# Patient Record
Sex: MALE | Race: WHITE | NOT HISPANIC OR LATINO | Employment: OTHER | URBAN - METROPOLITAN AREA
[De-identification: names, ages, dates, MRNs, and addresses within clinical notes are randomized per-mention and may not be internally consistent; named-entity substitution may affect disease eponyms.]

---

## 2024-05-23 ENCOUNTER — OFFICE VISIT (OUTPATIENT)
Dept: NEUROSURGERY | Facility: CLINIC | Age: 74
End: 2024-05-23
Payer: MEDICARE

## 2024-05-23 VITALS
WEIGHT: 161.5 LBS | SYSTOLIC BLOOD PRESSURE: 124 MMHG | TEMPERATURE: 97.2 F | DIASTOLIC BLOOD PRESSURE: 68 MMHG | HEIGHT: 67 IN | HEART RATE: 52 BPM | OXYGEN SATURATION: 97 % | BODY MASS INDEX: 25.35 KG/M2 | RESPIRATION RATE: 16 BRPM

## 2024-05-23 DIAGNOSIS — M51.34 THORACIC DEGENERATIVE DISC DISEASE: ICD-10-CM

## 2024-05-23 DIAGNOSIS — M50.30 DDD (DEGENERATIVE DISC DISEASE), CERVICAL: ICD-10-CM

## 2024-05-23 DIAGNOSIS — M54.12 CERVICAL RADICULOPATHY: Primary | ICD-10-CM

## 2024-05-23 DIAGNOSIS — M54.6 PAIN IN THORACIC SPINE: ICD-10-CM

## 2024-05-23 RX ORDER — TRAZODONE HYDROCHLORIDE 50 MG/1
TABLET ORAL
COMMUNITY

## 2024-05-23 RX ORDER — ESCITALOPRAM OXALATE 20 MG/1
1 TABLET ORAL DAILY
COMMUNITY

## 2024-05-23 RX ORDER — BUSPIRONE HYDROCHLORIDE 10 MG/1
1 TABLET ORAL DAILY
COMMUNITY

## 2024-05-23 RX ORDER — PREGABALIN 100 MG/1
CAPSULE ORAL
COMMUNITY

## 2024-05-23 RX ORDER — TIMOLOL MALEATE 5 MG/ML
SOLUTION/ DROPS OPHTHALMIC
COMMUNITY
Start: 2024-01-28

## 2024-05-23 RX ORDER — OXYCODONE AND ACETAMINOPHEN 10; 325 MG/1; MG/1
TABLET ORAL
COMMUNITY

## 2024-05-23 RX ORDER — TAMSULOSIN HYDROCHLORIDE 0.4 MG/1
CAPSULE ORAL
COMMUNITY
Start: 2024-01-25

## 2024-05-23 RX ORDER — PREDNISOLONE ACETATE 10 MG/ML
SUSPENSION/ DROPS OPHTHALMIC
COMMUNITY
Start: 2024-01-28

## 2024-05-23 RX ORDER — ROPINIROLE 0.5 MG/1
1 TABLET, FILM COATED ORAL DAILY
COMMUNITY

## 2024-05-23 RX ORDER — CELECOXIB 200 MG/1
CAPSULE ORAL
COMMUNITY

## 2024-05-23 RX ORDER — ATORVASTATIN CALCIUM 20 MG/1
1 TABLET, FILM COATED ORAL DAILY
COMMUNITY

## 2024-05-23 NOTE — PROGRESS NOTES
Patient ID: Tony L Gowers is a 73 y.o. male is being seen for consultation today at the request of Jean Marie Coreas MD for multilevel cervical stenosis.    Imaging: Last MRI of the cervical spine performed on 04/29/2024    Subjective     The patient is here in regards to   Chief Complaint   Patient presents with    Neck Pain       History of Present Illness  Dionicio has been dealing with over 6 years of thoracic back pain and over the past 4 months has had gradually worsening right-sided cervical radiculopathy as well.  Has had multiple cervical/thoracic epidural steroid injections which were previously helpful but no longer last very long and his last injection gave him about 1.5 days of relief.  He has been taking Percocet 10 mg 3 times per day in addition to Lyrica 103 times per day for pain relief but he feels that the medications make him a little bit forgetful and feels like he is swimming and this affects his activities of daily living.  He has also recently completed physical therapy for his mid back      While in the room and during my examination of the patient I wore a mask and eye protection.  I washed my hands before and after this patient encounter.  The patient was also wearing a mask.    The following portions of the patient's history were reviewed and updated as appropriate: allergies, current medications, past family history, past medical history, past social history, past surgical history and problem list.    Review of Systems   Constitutional:  Negative for fever.   HENT:  Positive for tinnitus. Negative for congestion and trouble swallowing.    Eyes:  Negative for visual disturbance.   Respiratory:  Negative for chest tightness and shortness of breath.    Cardiovascular:  Negative for chest pain.   Gastrointestinal:  Negative for diarrhea, nausea and vomiting.   Endocrine: Negative for cold intolerance and heat intolerance.   Genitourinary:  Positive for difficulty urinating.   Musculoskeletal:   Positive for back pain and neck stiffness. Negative for neck pain.   Skin:  Negative for rash.   Allergic/Immunologic: Positive for environmental allergies.   Neurological:  Positive for numbness (right arm). Negative for dizziness, weakness, light-headedness and headaches.   Hematological:  Does not bruise/bleed easily.   Psychiatric/Behavioral:  Positive for decreased concentration. Negative for confusion.         History reviewed. No pertinent past medical history.    No Known Allergies    History reviewed. No pertinent family history.    Social History     Socioeconomic History    Marital status:    Tobacco Use    Smoking status: Never    Smokeless tobacco: Never       History reviewed. No pertinent surgical history.      Objective     Vitals:    05/23/24 0845   BP: 124/68   Pulse: 52   Resp: 16   Temp: 97.2 °F (36.2 °C)   SpO2: 97%     Body mass index is 25.29 kg/m².    Physical Exam  Constitutional:       Appearance: Normal appearance.   HENT:      Head: Normocephalic and atraumatic.   Eyes:      Extraocular Movements: Extraocular movements intact.      Conjunctiva/sclera: Conjunctivae normal.      Pupils: Pupils are equal, round, and reactive to light.   Cardiovascular:      Rate and Rhythm: Normal rate and regular rhythm.      Pulses: Normal pulses.   Pulmonary:      Breath sounds: Normal breath sounds.   Abdominal:      Palpations: Abdomen is soft.   Musculoskeletal:         General: Normal range of motion.      Cervical back: Normal range of motion and neck supple.   Skin:     General: Skin is warm and dry.   Neurological:      Mental Status: He is alert and oriented to person, place, and time.      Cranial Nerves: Cranial nerves 2-12 are intact.      Motor: Motor function is intact. No weakness or atrophy.      Coordination: Coordination is intact. Romberg sign negative. Romberg Test normal.      Gait: Gait is intact. Gait normal.      Deep Tendon Reflexes: Reflexes are normal and symmetric.       Reflex Scores:       Tricep reflexes are 2+ on the right side and 2+ on the left side.       Bicep reflexes are 2+ on the right side and 2+ on the left side.       Brachioradialis reflexes are 2+ on the right side and 2+ on the left side.       Patellar reflexes are 2+ on the right side and 2+ on the left side.       Achilles reflexes are 2+ on the right side and 2+ on the left side.  Psychiatric:         Speech: Speech normal.         Neurologic Exam     Mental Status   Oriented to person, place, and time.   Attention: normal. Concentration: normal.   Speech: speech is normal   Level of consciousness: alert    Cranial Nerves   Cranial nerves II through XII intact.     CN III, IV, VI   Pupils are equal, round, and reactive to light.    Motor Exam   Muscle bulk: normal  Overall muscle tone: normal    Strength   Strength 5/5 except as noted.     Sensory Exam   Light touch normal.     Gait, Coordination, and Reflexes     Gait  Gait: normal    Coordination   Romberg: negative    Reflexes   Reflexes 2+ except as noted.   Right brachioradialis: 2+  Left brachioradialis: 2+  Right biceps: 2+  Left biceps: 2+  Right triceps: 2+  Left triceps: 2+  Right patellar: 2+  Left patellar: 2+  Right achilles: 2+  Left achilles: 2+      Assessment & Plan   Independent Review of Radiographic Studies:      I personally reviewed the images from the following studies.    MR: MRI of the cervical spine wo contrast was reviewed and shows C3-4 spondylolisthesis with C4-5, C5-6, C6-7 advanced degenerative disc disease with bilateral foraminal stenosis at all these levels is with some mild to moderate central stenosis and loss of CSF signal.  There is also loss of normal cervical lordosis at these levels.  MRI of the thoracic spine wo contrast was reviewed and shows Degenerative disc disease in the thoracic spine with T5-6, T6-7, T7-8 disc bulges.  There is multiple Schmorl's nodes at these levels as well.    Assessment/Plan: Has degenerative  disc disease in the cervical and thoracic spine.  I think he will benefit from physical therapy and an epidural steroid injection.  If necessary, I do think that he will benefit from an ACDF of the cervical spine but he needs to be off of Percocet prior to surgery.  I counseled him on slowly weaning himself off of Percocet.  We may also discussed with his provider who is prescribing Lexapro to switch to Cymbalta which can help with pain.    Medical Decision Making:      Epidural steroid injection  Physical therapy         Diagnoses and all orders for this visit:    1. Cervical radiculopathy (Primary)  -     Epidural Block  -     Ambulatory Referral to Physical Therapy    2. Pain in thoracic spine  -     Epidural Block  -     Ambulatory Referral to Physical Therapy    3. Thoracic degenerative disc disease  -     Epidural Block  -     Ambulatory Referral to Physical Therapy    4. DDD (degenerative disc disease), cervical  -     Epidural Block  -     Ambulatory Referral to Physical Therapy             Patient Instructions/Recommendations:    Follow-up in 3 months      Jefe Alexandra MD  05/23/24  09:50 EDT

## 2024-06-18 ENCOUNTER — TELEPHONE (OUTPATIENT)
Dept: NEUROSURGERY | Facility: CLINIC | Age: 74
End: 2024-06-18
Payer: MEDICARE

## 2024-08-12 ENCOUNTER — TELEPHONE (OUTPATIENT)
Dept: NEUROSURGERY | Facility: CLINIC | Age: 74
End: 2024-08-12
Payer: MEDICARE

## 2024-08-12 NOTE — TELEPHONE ENCOUNTER
Called patient. Informed patient  will be out of office on original appt this week. We need to reschedule. Rescheduled for 08/23/2024.

## 2024-08-22 NOTE — PROGRESS NOTES
Patient ID: Tony L Gowers is a 73 y.o. male is here today for follow-up for cervical radiculopathy and pain in thoracic spine.    Treatment: Cervical epidural injection performed on 2024    Subjective     The patient is here in regards to   Chief Complaint   Patient presents with    Neck Pain    Follow-up       History of Present Illness  Has weaned himself off of Percocet and has started on Cymbalta and gabapentin and has found this to be very effective in treating his numbness and tingling pain.  He now has significant numbness in his right arm area pain he still has significant cervicalgia and neck stiffness.  His epidural steroid injection gave him very minimal relief temporarily but has worn off.      While in the room and during my examination of the patient I wore a mask and eye protection.  I washed my hands before and after this patient encounter.  The patient was also wearing a mask.    The following portions of the patient's history were reviewed and updated as appropriate: allergies, current medications, past family history, past medical history, past social history, past surgical history and problem list.    Review of Systems   Eyes:  Negative for visual disturbance.   Gastrointestinal:  Negative for nausea and vomiting.   Musculoskeletal:  Positive for neck pain and neck stiffness.   Neurological:  Positive for dizziness, weakness and numbness. Negative for light-headedness and headaches.        History reviewed. No pertinent past medical history.    No Known Allergies    History reviewed. No pertinent family history.    Social History     Socioeconomic History    Marital status:    Tobacco Use    Smoking status: Former     Current packs/day: 0.00     Types: Cigarettes     Quit date: 1994     Years since quittin.0    Smokeless tobacco: Never       History reviewed. No pertinent surgical history.      Objective     Vitals:    24 1258   BP: 122/64   Pulse: 69   Resp: 16    SpO2: 94%     Body mass index is 26.08 kg/m².    Physical Exam  Constitutional:       Appearance: Normal appearance.   HENT:      Head: Normocephalic and atraumatic.   Eyes:      Extraocular Movements: Extraocular movements intact.      Conjunctiva/sclera: Conjunctivae normal.      Pupils: Pupils are equal, round, and reactive to light.   Cardiovascular:      Rate and Rhythm: Normal rate and regular rhythm.      Pulses: Normal pulses.   Pulmonary:      Breath sounds: Normal breath sounds.   Abdominal:      Palpations: Abdomen is soft.   Musculoskeletal:         General: Normal range of motion.      Cervical back: Normal range of motion and neck supple.   Skin:     General: Skin is warm and dry.   Neurological:      Mental Status: He is alert and oriented to person, place, and time.      Cranial Nerves: Cranial nerves 2-12 are intact.      Motor: Motor function is intact. No weakness or atrophy.      Coordination: Coordination is intact. Romberg sign negative. Romberg Test normal.      Gait: Gait is intact. Gait normal.      Deep Tendon Reflexes: Reflexes are normal and symmetric.      Reflex Scores:       Tricep reflexes are 2+ on the right side and 2+ on the left side.       Bicep reflexes are 2+ on the right side and 2+ on the left side.       Brachioradialis reflexes are 2+ on the right side and 2+ on the left side.       Patellar reflexes are 2+ on the right side and 2+ on the left side.       Achilles reflexes are 2+ on the right side and 2+ on the left side.  Psychiatric:         Speech: Speech normal.         Neurologic Exam     Mental Status   Oriented to person, place, and time.   Attention: normal. Concentration: normal.   Speech: speech is normal   Level of consciousness: alert    Cranial Nerves   Cranial nerves II through XII intact.     CN III, IV, VI   Pupils are equal, round, and reactive to light.    Motor Exam   Muscle bulk: normal  Overall muscle tone: normal    Strength   Strength 5/5 except as  noted.     Sensory Exam   Light touch normal.     Gait, Coordination, and Reflexes     Gait  Gait: normal    Coordination   Romberg: negative    Reflexes   Reflexes 2+ except as noted.   Right brachioradialis: 2+  Left brachioradialis: 2+  Right biceps: 2+  Left biceps: 2+  Right triceps: 2+  Left triceps: 2+  Right patellar: 2+  Left patellar: 2+  Right achilles: 2+  Left achilles: 2+      Assessment & Plan   Independent Review of Radiographic Studies:      I personally reviewed the images from the following studies.    No new neuroimaging.    Assessment/Plan: Continues to have cervical radiculopathy and cervicalgia.  He would like to proceed with ACDF.  We discussed the risk and benefits and alternatives of surgery including CSF leak, injury to nerve roots, need for further surgery down the line.  He understands as well to proceed with surgery.    Medical Decision Making:      C4-7 ACDF         Diagnoses and all orders for this visit:    1. Cervical radiculopathy (Primary)  -     Case Request; Standing  -     CBC & Differential; Future  -     Comprehensive Metabolic Panel; Future  -     aPTT; Future  -     Protime-INR; Future  -     Type & Screen; Future  -     ceFAZolin (ANCEF) 2,000 mg in sodium chloride 0.9 % 100 mL IVPB  -     Case Request    2. Abnormal coagulation profile  -     aPTT; Future  -     Protime-INR; Future    Other orders  -     Inpatient Admission; Standing  -     Follow Anesthesia Guidelines / Protocol; Future  -     Follow Anesthesia Guidelines / Protocol; Standing  -     Verify / Perform Chlorhexidine Skin Prep; Standing  -     Provide Patient With Instructions on NPO Status; Future  -     Provide Chlorhexidine Skin Prep Wipes and Instructions; Future             Patient Instructions/Recommendations:    Call with any questions or concerns      Jefe Alexandra MD  08/23/24  13:20 EDT

## 2024-08-23 ENCOUNTER — OFFICE VISIT (OUTPATIENT)
Dept: NEUROSURGERY | Facility: CLINIC | Age: 74
End: 2024-08-23
Payer: MEDICARE

## 2024-08-23 VITALS
SYSTOLIC BLOOD PRESSURE: 122 MMHG | HEIGHT: 67 IN | BODY MASS INDEX: 26.13 KG/M2 | HEART RATE: 69 BPM | DIASTOLIC BLOOD PRESSURE: 64 MMHG | RESPIRATION RATE: 16 BRPM | WEIGHT: 166.5 LBS | OXYGEN SATURATION: 94 %

## 2024-08-23 DIAGNOSIS — M54.12 CERVICAL RADICULOPATHY: Primary | ICD-10-CM

## 2024-08-23 DIAGNOSIS — R79.1 ABNORMAL COAGULATION PROFILE: ICD-10-CM

## 2024-08-23 RX ORDER — DULOXETIN HYDROCHLORIDE 60 MG/1
CAPSULE, DELAYED RELEASE ORAL
COMMUNITY
Start: 2024-08-08

## 2024-08-23 RX ORDER — HYDROXYZINE HYDROCHLORIDE 25 MG/1
TABLET, FILM COATED ORAL
COMMUNITY
Start: 2024-08-08

## 2024-08-28 ENCOUNTER — TELEPHONE (OUTPATIENT)
Dept: NEUROSURGERY | Facility: CLINIC | Age: 74
End: 2024-08-28
Payer: MEDICARE

## 2024-08-28 NOTE — TELEPHONE ENCOUNTER
Called patient and reviewed all of the new date changes. Patient agreed to have surgery on 09/16/2024.

## 2024-08-28 NOTE — TELEPHONE ENCOUNTER
Called patient and left VM. Informed patient per  his surgery must move to 09/16/2024 due to  surgery schedule. Asked patient to call back so we can go over new arrival times.

## 2024-09-04 ENCOUNTER — OFFICE VISIT (OUTPATIENT)
Dept: NEUROSURGERY | Facility: CLINIC | Age: 74
End: 2024-09-04
Payer: MEDICARE

## 2024-09-04 VITALS
RESPIRATION RATE: 16 BRPM | TEMPERATURE: 97.1 F | DIASTOLIC BLOOD PRESSURE: 78 MMHG | WEIGHT: 166.6 LBS | OXYGEN SATURATION: 95 % | BODY MASS INDEX: 26.15 KG/M2 | HEIGHT: 67 IN | HEART RATE: 66 BPM | SYSTOLIC BLOOD PRESSURE: 112 MMHG

## 2024-09-04 DIAGNOSIS — M54.12 CERVICAL RADICULOPATHY: Primary | ICD-10-CM

## 2024-09-04 PROCEDURE — 1159F MED LIST DOCD IN RCRD: CPT | Performed by: NEUROLOGICAL SURGERY

## 2024-09-04 PROCEDURE — 1160F RVW MEDS BY RX/DR IN RCRD: CPT | Performed by: NEUROLOGICAL SURGERY

## 2024-09-04 PROCEDURE — 99213 OFFICE O/P EST LOW 20 MIN: CPT | Performed by: NEUROLOGICAL SURGERY

## 2024-09-04 NOTE — PROGRESS NOTES
Patient ID: Tony L Gowers is a 73 y.o. male is here today for follow-up to discuss having surgery for cervical radiculopathy.    Subjective     The patient is here in regards to   Chief Complaint   Patient presents with    Neck Pain    Follow-up       History of Present Illness  Continues to have cervicalgia and numbness in his right upper extremity with radiculopathy.  He is here for his preoperative appointment      While in the room and during my examination of the patient I wore a mask and eye protection.  I washed my hands before and after this patient encounter.  The patient was also wearing a mask.    The following portions of the patient's history were reviewed and updated as appropriate: allergies, current medications, past family history, past medical history, past social history, past surgical history and problem list.    Review of Systems   Constitutional:  Negative for fever.   Eyes:  Negative for visual disturbance.   Gastrointestinal:  Negative for nausea and vomiting.   Musculoskeletal:  Positive for back pain. Negative for neck pain and neck stiffness.   Neurological:  Positive for weakness and numbness (right arm). Negative for dizziness, light-headedness and headaches.        History reviewed. No pertinent past medical history.    No Known Allergies    History reviewed. No pertinent family history.    Social History     Socioeconomic History    Marital status:    Tobacco Use    Smoking status: Former     Current packs/day: 0.00     Types: Cigarettes     Quit date: 1994     Years since quittin.0    Smokeless tobacco: Never   Vaping Use    Vaping status: Never Used       History reviewed. No pertinent surgical history.      Objective     Vitals:    24 1337   BP: 112/78   Pulse: 66   Resp: 16   Temp: 97.1 °F (36.2 °C)   SpO2: 95%     Body mass index is 26.09 kg/m².    Physical Exam  Constitutional:       Appearance: Normal appearance.   HENT:      Head: Normocephalic and  atraumatic.   Eyes:      Extraocular Movements: Extraocular movements intact.      Conjunctiva/sclera: Conjunctivae normal.      Pupils: Pupils are equal, round, and reactive to light.   Cardiovascular:      Rate and Rhythm: Normal rate and regular rhythm.      Pulses: Normal pulses.   Pulmonary:      Breath sounds: Normal breath sounds.   Abdominal:      Palpations: Abdomen is soft.   Musculoskeletal:         General: Normal range of motion.      Cervical back: Normal range of motion and neck supple.   Skin:     General: Skin is warm and dry.   Neurological:      Mental Status: He is alert and oriented to person, place, and time.      Cranial Nerves: Cranial nerves 2-12 are intact.      Motor: Motor function is intact. No weakness or atrophy.      Coordination: Coordination is intact. Romberg sign negative. Romberg Test normal.      Gait: Gait is intact. Gait normal.      Deep Tendon Reflexes: Reflexes are normal and symmetric.      Reflex Scores:       Tricep reflexes are 2+ on the right side and 2+ on the left side.       Bicep reflexes are 2+ on the right side and 2+ on the left side.       Brachioradialis reflexes are 2+ on the right side and 2+ on the left side.       Patellar reflexes are 2+ on the right side and 2+ on the left side.       Achilles reflexes are 2+ on the right side and 2+ on the left side.  Psychiatric:         Speech: Speech normal.         Neurologic Exam     Mental Status   Oriented to person, place, and time.   Attention: normal. Concentration: normal.   Speech: speech is normal   Level of consciousness: alert    Cranial Nerves   Cranial nerves II through XII intact.     CN III, IV, VI   Pupils are equal, round, and reactive to light.    Motor Exam   Muscle bulk: normal  Overall muscle tone: normal    Strength   Strength 5/5 except as noted.     Sensory Exam   Light touch normal.     Gait, Coordination, and Reflexes     Gait  Gait: normal    Coordination   Romberg: negative    Reflexes    Reflexes 2+ except as noted.   Right brachioradialis: 2+  Left brachioradialis: 2+  Right biceps: 2+  Left biceps: 2+  Right triceps: 2+  Left triceps: 2+  Right patellar: 2+  Left patellar: 2+  Right achilles: 2+  Left achilles: 2+      Assessment & Plan   Independent Review of Radiographic Studies:      I personally reviewed the images from the following studies.    No new neuroimaging.    Assessment/Plan: We discussed the risks and benefits alternatives of an ACDF including CSF leak, injury to nerve roots, need for further surgery down the line as well as cervical hematoma, infection.  He understands and is willing to proceed with surgery    Medical Decision Making:      C4-7 ACDF         Diagnoses and all orders for this visit:    1. Cervical radiculopathy (Primary)             Patient Instructions/Recommendations:    Call with any questions or concerns      Jefe Alexandra MD  09/04/24  14:06 EDT

## 2024-09-09 ENCOUNTER — PRE-ADMISSION TESTING (OUTPATIENT)
Dept: PREADMISSION TESTING | Facility: HOSPITAL | Age: 74
End: 2024-09-09
Payer: MEDICARE

## 2024-09-09 VITALS
TEMPERATURE: 98 F | DIASTOLIC BLOOD PRESSURE: 80 MMHG | HEIGHT: 67 IN | OXYGEN SATURATION: 98 % | SYSTOLIC BLOOD PRESSURE: 129 MMHG | RESPIRATION RATE: 16 BRPM | BODY MASS INDEX: 25.65 KG/M2 | WEIGHT: 163.4 LBS | HEART RATE: 60 BPM

## 2024-09-09 DIAGNOSIS — M54.12 CERVICAL RADICULOPATHY: ICD-10-CM

## 2024-09-09 DIAGNOSIS — R79.1 ABNORMAL COAGULATION PROFILE: ICD-10-CM

## 2024-09-09 LAB
ABO GROUP BLD: NORMAL
ALBUMIN SERPL-MCNC: 4.3 G/DL (ref 3.5–5.2)
ALBUMIN/GLOB SERPL: 1.9 G/DL
ALP SERPL-CCNC: 98 U/L (ref 39–117)
ALT SERPL W P-5'-P-CCNC: 14 U/L (ref 1–41)
ANION GAP SERPL CALCULATED.3IONS-SCNC: 7.8 MMOL/L (ref 5–15)
APTT PPP: 28.2 SECONDS (ref 22.7–35.4)
AST SERPL-CCNC: 22 U/L (ref 1–40)
BASOPHILS # BLD AUTO: 0.05 10*3/MM3 (ref 0–0.2)
BASOPHILS NFR BLD AUTO: 0.7 % (ref 0–1.5)
BILIRUB SERPL-MCNC: 0.4 MG/DL (ref 0–1.2)
BLD GP AB SCN SERPL QL: NEGATIVE
BUN SERPL-MCNC: 13 MG/DL (ref 8–23)
BUN/CREAT SERPL: 13.8 (ref 7–25)
CALCIUM SPEC-SCNC: 9.6 MG/DL (ref 8.6–10.5)
CHLORIDE SERPL-SCNC: 101 MMOL/L (ref 98–107)
CO2 SERPL-SCNC: 28.2 MMOL/L (ref 22–29)
CREAT SERPL-MCNC: 0.94 MG/DL (ref 0.76–1.27)
DEPRECATED RDW RBC AUTO: 43.3 FL (ref 37–54)
EGFRCR SERPLBLD CKD-EPI 2021: 86.7 ML/MIN/1.73
EOSINOPHIL # BLD AUTO: 0.25 10*3/MM3 (ref 0–0.4)
EOSINOPHIL NFR BLD AUTO: 3.6 % (ref 0.3–6.2)
ERYTHROCYTE [DISTWIDTH] IN BLOOD BY AUTOMATED COUNT: 12.9 % (ref 12.3–15.4)
GLOBULIN UR ELPH-MCNC: 2.3 GM/DL
GLUCOSE SERPL-MCNC: 93 MG/DL (ref 65–99)
HCT VFR BLD AUTO: 43 % (ref 37.5–51)
HGB BLD-MCNC: 14.3 G/DL (ref 13–17.7)
IMM GRANULOCYTES # BLD AUTO: 0.01 10*3/MM3 (ref 0–0.05)
IMM GRANULOCYTES NFR BLD AUTO: 0.1 % (ref 0–0.5)
INR PPP: 0.95 (ref 0.9–1.1)
LYMPHOCYTES # BLD AUTO: 2.4 10*3/MM3 (ref 0.7–3.1)
LYMPHOCYTES NFR BLD AUTO: 34.7 % (ref 19.6–45.3)
MCH RBC QN AUTO: 30.4 PG (ref 26.6–33)
MCHC RBC AUTO-ENTMCNC: 33.3 G/DL (ref 31.5–35.7)
MCV RBC AUTO: 91.3 FL (ref 79–97)
MONOCYTES # BLD AUTO: 0.85 10*3/MM3 (ref 0.1–0.9)
MONOCYTES NFR BLD AUTO: 12.3 % (ref 5–12)
NEUTROPHILS NFR BLD AUTO: 3.35 10*3/MM3 (ref 1.7–7)
NEUTROPHILS NFR BLD AUTO: 48.6 % (ref 42.7–76)
NRBC BLD AUTO-RTO: 0 /100 WBC (ref 0–0.2)
PLATELET # BLD AUTO: 180 10*3/MM3 (ref 140–450)
PMV BLD AUTO: 10.9 FL (ref 6–12)
POTASSIUM SERPL-SCNC: 4.4 MMOL/L (ref 3.5–5.2)
PROT SERPL-MCNC: 6.6 G/DL (ref 6–8.5)
PROTHROMBIN TIME: 12.8 SECONDS (ref 11.7–14.2)
QT INTERVAL: 425 MS
QTC INTERVAL: 392 MS
RBC # BLD AUTO: 4.71 10*6/MM3 (ref 4.14–5.8)
RH BLD: POSITIVE
SODIUM SERPL-SCNC: 137 MMOL/L (ref 136–145)
T&S EXPIRATION DATE: NORMAL
WBC NRBC COR # BLD AUTO: 6.91 10*3/MM3 (ref 3.4–10.8)

## 2024-09-09 PROCEDURE — 80053 COMPREHEN METABOLIC PANEL: CPT

## 2024-09-09 PROCEDURE — 85025 COMPLETE CBC W/AUTO DIFF WBC: CPT

## 2024-09-09 PROCEDURE — 93005 ELECTROCARDIOGRAM TRACING: CPT

## 2024-09-09 PROCEDURE — 85610 PROTHROMBIN TIME: CPT

## 2024-09-09 PROCEDURE — 86900 BLOOD TYPING SEROLOGIC ABO: CPT

## 2024-09-09 PROCEDURE — 86901 BLOOD TYPING SEROLOGIC RH(D): CPT

## 2024-09-09 PROCEDURE — 36415 COLL VENOUS BLD VENIPUNCTURE: CPT

## 2024-09-09 PROCEDURE — 85730 THROMBOPLASTIN TIME PARTIAL: CPT

## 2024-09-09 PROCEDURE — 86850 RBC ANTIBODY SCREEN: CPT

## 2024-09-09 NOTE — DISCHARGE INSTRUCTIONS
Take the following medications the morning of surgery with a small sip of water:    CYMBALTA, EYE DROPS, LYRICA, REQUIP, FLOMAX    If you are on prescription narcotic pain medication to control your pain you may also take that medication the morning of surgery.    General Instructions:  Do not eat or drink anything after midnight the night before surgery.  Patients who avoid smoking, chewing tobacco and alcohol for 4 weeks prior to surgery have a reduced risk of post-operative complications.   Do not smoke, use chewing tobacco or drink alcohol the day of surgery.   Bring any papers given to you in the doctor’s office.  Wear clean comfortable clothes.  Do not wear contact lenses, false eyelashes or make-up.  Bring a case for your glasses.   Remove all piercings.  Leave jewelry and any other valuables at home.  The Pre-Admission Testing nurse will instruct you to bring medications if unable to obtain an accurate list in Pre-Admission Testing.        If you were given a blood bank ID arm band remember to bring it with you the day of surgery.    Preventing a Surgical Site Infection:  For 2 to 3 days before surgery, avoid shaving with a razor because the razor can irritate skin and make it easier to develop an infection.    Any areas of open skin can increase the risk of a post-operative wound infection by allowing bacteria to enter and travel throughout the body.  Notify your surgeon if you have any skin wounds / rashes even if it is not near the expected surgical site.  The area will need assessed to determine if surgery should be delayed until it is healed.  The night prior to surgery shower using a fresh bar of anti-bacterial soap (such as Dial) and clean washcloth.  Sleep in a clean bed with clean clothing.  Do not allow pets to sleep with you.  Shower on the morning of surgery using a fresh bar of anti-bacterial soap (such as Dial) and clean washcloth.  Dry with a clean towel and dress in clean clothing.  Ask your  surgeon if you will be receiving antibiotics prior to surgery.  Make sure you, your family, and all healthcare providers clean their hands with soap and water or an alcohol based hand  before caring for you or your wound.    Day of surgery:  Your arrival time is approximately two hours before your scheduled surgery time.  Please note if you have an early arrival time the surgery doors do not open before 5:00 AM.  Upon arrival, a Pre-op nurse and Anesthesiologist will review your health history, obtain vital signs, and answer questions you may have.  The only belongings needed at this time will be your home medications and if applicable your C-PAP/BI-PAP machine.  A Pre-op nurse will start an IV and you may receive medication in preparation for surgery, including something to help you relax.      Please be aware that surgery does come with discomfort.  We want to make every effort to control your discomfort so please discuss any uncontrolled symptoms with your nurse.   Your doctor will most likely have prescribed pain medications.      CHLORHEXIDINE CLOTH INSTRUCTIONS  The morning of surgery follow these instructions using the Chlorhexidine cloths you've been given.  These steps reduce bacteria on the body.  Do not use the cloths near your eyes, ears mouth, genitalia or on open wounds.  Throw the cloths away after use but do not try to flush them down a toilet.      Open and remove one cloth at a time from the package.    Leave the cloth unfolded and begin the bathing.  Massage the skin with the cloths using gentle pressure to remove bacteria.  Do not scrub harshly.   Follow the steps below with one 2% CHG cloth per area (6 total cloths).  One cloth for neck, shoulders and chest.  One cloth for both arms, hands, fingers and underarms (do underarms last).  One cloth for the abdomen followed by groin.  One cloth for right leg and foot including between the toes.  One cloth for left leg and foot including  between the toes.  The last cloth is to be used for the back of the neck, back and buttocks.    Allow the CHG to air dry 3 minutes on the skin which will give it time to work and decrease the chance of irritation.  The skin may feel sticky until it is dry.  Do not rinse with water or any other liquid or you will lose the beneficial effects of the CHG.  If mild skin irritation occurs, do rinse the skin to remove the CHG.  Report this to the nurse at time of admission.  Do not apply lotions, creams, ointments, deodorants or perfumes after using the clothes. Dress in clean clothes before coming to the hospital.    If you are staying overnight following surgery, you will be transported to your hospital room following the recovery period.  [facility] has all private rooms.    If you have any questions please call Pre-Admission Testing at [phone].  Deductibles and co-payments are collected on the day of service. Please be prepared to pay the required co-pay, deductible or deposit on the day of service as defined by your plan.    Call your surgeon immediately if you experience any of the following symptoms:  Sore Throat  Shortness of Breath or difficulty breathing  Cough  Chills  Body soreness or muscle pain  Headache  Fever  New loss of taste or smell  Do not arrive for your surgery ill.  Your procedure will need to be rescheduled to another time.  You will need to call your physician before the day of surgery to avoid any unnecessary exposure to hospital staff as well as other patients.

## 2024-09-17 ENCOUNTER — HOSPITAL ENCOUNTER (INPATIENT)
Facility: HOSPITAL | Age: 74
LOS: 1 days | Discharge: HOME | End: 2024-09-18
Attending: NEUROLOGICAL SURGERY | Admitting: NEUROLOGICAL SURGERY
Payer: MEDICARE

## 2024-09-17 ENCOUNTER — ANESTHESIA (OUTPATIENT)
Dept: PERIOP | Facility: HOSPITAL | Age: 74
End: 2024-09-17
Payer: MEDICARE

## 2024-09-17 ENCOUNTER — APPOINTMENT (OUTPATIENT)
Dept: GENERAL RADIOLOGY | Facility: HOSPITAL | Age: 74
End: 2024-09-17
Payer: MEDICARE

## 2024-09-17 ENCOUNTER — ANESTHESIA EVENT (OUTPATIENT)
Dept: PERIOP | Facility: HOSPITAL | Age: 74
End: 2024-09-17
Payer: MEDICARE

## 2024-09-17 DIAGNOSIS — M50.30 DDD (DEGENERATIVE DISC DISEASE), CERVICAL: Primary | ICD-10-CM

## 2024-09-17 DIAGNOSIS — M54.12 CERVICAL RADICULOPATHY: ICD-10-CM

## 2024-09-17 PROCEDURE — 25010000002 MIDAZOLAM PER 1 MG: Performed by: ANESTHESIOLOGY

## 2024-09-17 PROCEDURE — 22552 ARTHRD ANT NTRBD CERVICAL EA: CPT

## 2024-09-17 PROCEDURE — 22853 INSJ BIOMECHANICAL DEVICE: CPT | Performed by: NEUROLOGICAL SURGERY

## 2024-09-17 PROCEDURE — C1713 ANCHOR/SCREW BN/BN,TIS/BN: HCPCS | Performed by: NEUROLOGICAL SURGERY

## 2024-09-17 PROCEDURE — 25010000002 DEXAMETHASONE SODIUM PHOSPHATE 20 MG/5ML SOLUTION: Performed by: NURSE ANESTHETIST, CERTIFIED REGISTERED

## 2024-09-17 PROCEDURE — 76000 FLUOROSCOPY <1 HR PHYS/QHP: CPT

## 2024-09-17 PROCEDURE — 25810000003 LACTATED RINGERS PER 1000 ML: Performed by: ANESTHESIOLOGY

## 2024-09-17 PROCEDURE — 22552 ARTHRD ANT NTRBD CERVICAL EA: CPT | Performed by: NEUROLOGICAL SURGERY

## 2024-09-17 PROCEDURE — 0RG20K0 FUSION OF 2 OR MORE CERVICAL VERTEBRAL JOINTS WITH NONAUTOLOGOUS TISSUE SUBSTITUTE, ANTERIOR APPROACH, ANTERIOR COLUMN, OPEN APPROACH: ICD-10-PCS | Performed by: NEUROLOGICAL SURGERY

## 2024-09-17 PROCEDURE — 25010000002 PROPOFOL 200 MG/20ML EMULSION: Performed by: NURSE ANESTHETIST, CERTIFIED REGISTERED

## 2024-09-17 PROCEDURE — 22846 INSERT SPINE FIXATION DEVICE: CPT

## 2024-09-17 PROCEDURE — 22846 INSERT SPINE FIXATION DEVICE: CPT | Performed by: NEUROLOGICAL SURGERY

## 2024-09-17 PROCEDURE — 25010000002 FENTANYL CITRATE (PF) 50 MCG/ML SOLUTION: Performed by: NURSE ANESTHETIST, CERTIFIED REGISTERED

## 2024-09-17 PROCEDURE — 25810000003 LACTATED RINGERS PER 1000 ML: Performed by: NURSE ANESTHETIST, CERTIFIED REGISTERED

## 2024-09-17 PROCEDURE — 0RB30ZZ EXCISION OF CERVICAL VERTEBRAL DISC, OPEN APPROACH: ICD-10-PCS | Performed by: NEUROLOGICAL SURGERY

## 2024-09-17 PROCEDURE — 25010000002 HYDROMORPHONE PER 4 MG: Performed by: NURSE ANESTHETIST, CERTIFIED REGISTERED

## 2024-09-17 PROCEDURE — 22551 ARTHRD ANT NTRBDY CERVICAL: CPT

## 2024-09-17 PROCEDURE — 25010000002 CEFAZOLIN PER 500 MG: Performed by: NEUROLOGICAL SURGERY

## 2024-09-17 PROCEDURE — 22551 ARTHRD ANT NTRBDY CERVICAL: CPT | Performed by: NEUROLOGICAL SURGERY

## 2024-09-17 PROCEDURE — 25010000002 ONDANSETRON PER 1 MG: Performed by: NURSE ANESTHETIST, CERTIFIED REGISTERED

## 2024-09-17 PROCEDURE — 25010000002 SUGAMMADEX 200 MG/2ML SOLUTION: Performed by: NURSE ANESTHETIST, CERTIFIED REGISTERED

## 2024-09-17 PROCEDURE — 22853 INSJ BIOMECHANICAL DEVICE: CPT

## 2024-09-17 RX ORDER — SODIUM CHLORIDE, SODIUM LACTATE, POTASSIUM CHLORIDE, CALCIUM CHLORIDE 600; 310; 30; 20 MG/100ML; MG/100ML; MG/100ML; MG/100ML
9 INJECTION, SOLUTION INTRAVENOUS CONTINUOUS
Status: DISCONTINUED | OUTPATIENT
Start: 2024-09-17 | End: 2024-09-18 | Stop reason: HOSPADM

## 2024-09-17 RX ORDER — PROPOFOL 10 MG/ML
INJECTION, EMULSION INTRAVENOUS AS NEEDED
Status: DISCONTINUED | OUTPATIENT
Start: 2024-09-17 | End: 2024-09-17 | Stop reason: SURG

## 2024-09-17 RX ORDER — HYDROCODONE BITARTRATE AND ACETAMINOPHEN 5; 325 MG/1; MG/1
1 TABLET ORAL EVERY 4 HOURS PRN
Status: DISCONTINUED | OUTPATIENT
Start: 2024-09-17 | End: 2024-09-18 | Stop reason: HOSPADM

## 2024-09-17 RX ORDER — FENTANYL CITRATE 50 UG/ML
25 INJECTION, SOLUTION INTRAMUSCULAR; INTRAVENOUS
Status: DISCONTINUED | OUTPATIENT
Start: 2024-09-17 | End: 2024-09-17 | Stop reason: HOSPADM

## 2024-09-17 RX ORDER — ATORVASTATIN CALCIUM 20 MG/1
20 TABLET, FILM COATED ORAL DAILY
Status: DISCONTINUED | OUTPATIENT
Start: 2024-09-17 | End: 2024-09-18 | Stop reason: HOSPADM

## 2024-09-17 RX ORDER — TRANEXAMIC ACID 100 MG/ML
INJECTION, SOLUTION INTRAVENOUS AS NEEDED
Status: DISCONTINUED | OUTPATIENT
Start: 2024-09-17 | End: 2024-09-17 | Stop reason: SURG

## 2024-09-17 RX ORDER — SODIUM CHLORIDE 0.9 % (FLUSH) 0.9 %
3-10 SYRINGE (ML) INJECTION AS NEEDED
Status: DISCONTINUED | OUTPATIENT
Start: 2024-09-17 | End: 2024-09-17 | Stop reason: HOSPADM

## 2024-09-17 RX ORDER — MAGNESIUM HYDROXIDE 1200 MG/15ML
LIQUID ORAL AS NEEDED
Status: DISCONTINUED | OUTPATIENT
Start: 2024-09-17 | End: 2024-09-17 | Stop reason: HOSPADM

## 2024-09-17 RX ORDER — NALOXONE HCL 0.4 MG/ML
0.2 VIAL (ML) INJECTION AS NEEDED
Status: DISCONTINUED | OUTPATIENT
Start: 2024-09-17 | End: 2024-09-17 | Stop reason: HOSPADM

## 2024-09-17 RX ORDER — SODIUM CHLORIDE 9 MG/ML
40 INJECTION, SOLUTION INTRAVENOUS AS NEEDED
Status: DISCONTINUED | OUTPATIENT
Start: 2024-09-17 | End: 2024-09-18 | Stop reason: HOSPADM

## 2024-09-17 RX ORDER — PROMETHAZINE HYDROCHLORIDE 25 MG/1
25 TABLET ORAL ONCE AS NEEDED
Status: DISCONTINUED | OUTPATIENT
Start: 2024-09-17 | End: 2024-09-17 | Stop reason: HOSPADM

## 2024-09-17 RX ORDER — LABETALOL HYDROCHLORIDE 5 MG/ML
5 INJECTION, SOLUTION INTRAVENOUS
Status: DISCONTINUED | OUTPATIENT
Start: 2024-09-17 | End: 2024-09-17 | Stop reason: HOSPADM

## 2024-09-17 RX ORDER — HYDROMORPHONE HYDROCHLORIDE 1 MG/ML
0.25 INJECTION, SOLUTION INTRAMUSCULAR; INTRAVENOUS; SUBCUTANEOUS
Status: DISCONTINUED | OUTPATIENT
Start: 2024-09-17 | End: 2024-09-17 | Stop reason: HOSPADM

## 2024-09-17 RX ORDER — AMOXICILLIN 250 MG
2 CAPSULE ORAL 2 TIMES DAILY
Status: DISCONTINUED | OUTPATIENT
Start: 2024-09-17 | End: 2024-09-18 | Stop reason: HOSPADM

## 2024-09-17 RX ORDER — ACETAMINOPHEN 650 MG/1
650 SUPPOSITORY RECTAL EVERY 4 HOURS PRN
Status: DISCONTINUED | OUTPATIENT
Start: 2024-09-17 | End: 2024-09-18 | Stop reason: HOSPADM

## 2024-09-17 RX ORDER — METHOCARBAMOL 500 MG/1
500 TABLET, FILM COATED ORAL 4 TIMES DAILY PRN
Status: DISCONTINUED | OUTPATIENT
Start: 2024-09-17 | End: 2024-09-18 | Stop reason: HOSPADM

## 2024-09-17 RX ORDER — ROCURONIUM BROMIDE 10 MG/ML
INJECTION, SOLUTION INTRAVENOUS AS NEEDED
Status: DISCONTINUED | OUTPATIENT
Start: 2024-09-17 | End: 2024-09-17 | Stop reason: SURG

## 2024-09-17 RX ORDER — TAMSULOSIN HYDROCHLORIDE 0.4 MG/1
0.4 CAPSULE ORAL DAILY
Status: DISCONTINUED | OUTPATIENT
Start: 2024-09-17 | End: 2024-09-18 | Stop reason: HOSPADM

## 2024-09-17 RX ORDER — ONDANSETRON 2 MG/ML
4 INJECTION INTRAMUSCULAR; INTRAVENOUS EVERY 6 HOURS PRN
Status: DISCONTINUED | OUTPATIENT
Start: 2024-09-17 | End: 2024-09-18 | Stop reason: HOSPADM

## 2024-09-17 RX ORDER — POLYETHYLENE GLYCOL 3350 17 G/17G
17 POWDER, FOR SOLUTION ORAL DAILY PRN
Status: DISCONTINUED | OUTPATIENT
Start: 2024-09-17 | End: 2024-09-18 | Stop reason: HOSPADM

## 2024-09-17 RX ORDER — BUPIVACAINE HYDROCHLORIDE AND EPINEPHRINE 5; 5 MG/ML; UG/ML
INJECTION, SOLUTION EPIDURAL; INTRACAUDAL; PERINEURAL AS NEEDED
Status: DISCONTINUED | OUTPATIENT
Start: 2024-09-17 | End: 2024-09-17 | Stop reason: HOSPADM

## 2024-09-17 RX ORDER — IPRATROPIUM BROMIDE AND ALBUTEROL SULFATE 2.5; .5 MG/3ML; MG/3ML
3 SOLUTION RESPIRATORY (INHALATION) ONCE AS NEEDED
Status: DISCONTINUED | OUTPATIENT
Start: 2024-09-17 | End: 2024-09-17 | Stop reason: HOSPADM

## 2024-09-17 RX ORDER — TRAZODONE HYDROCHLORIDE 50 MG/1
50 TABLET, FILM COATED ORAL NIGHTLY
Status: DISCONTINUED | OUTPATIENT
Start: 2024-09-17 | End: 2024-09-18 | Stop reason: HOSPADM

## 2024-09-17 RX ORDER — DIPHENHYDRAMINE HYDROCHLORIDE 50 MG/ML
12.5 INJECTION INTRAMUSCULAR; INTRAVENOUS
Status: DISCONTINUED | OUTPATIENT
Start: 2024-09-17 | End: 2024-09-17 | Stop reason: HOSPADM

## 2024-09-17 RX ORDER — ROPINIROLE 0.5 MG/1
0.5 TABLET, FILM COATED ORAL 2 TIMES DAILY
Status: DISCONTINUED | OUTPATIENT
Start: 2024-09-17 | End: 2024-09-18 | Stop reason: HOSPADM

## 2024-09-17 RX ORDER — HYDRALAZINE HYDROCHLORIDE 20 MG/ML
5 INJECTION INTRAMUSCULAR; INTRAVENOUS
Status: DISCONTINUED | OUTPATIENT
Start: 2024-09-17 | End: 2024-09-17 | Stop reason: HOSPADM

## 2024-09-17 RX ORDER — EPHEDRINE SULFATE 50 MG/ML
INJECTION INTRAVENOUS AS NEEDED
Status: DISCONTINUED | OUTPATIENT
Start: 2024-09-17 | End: 2024-09-17 | Stop reason: SURG

## 2024-09-17 RX ORDER — TIMOLOL MALEATE 5 MG/ML
1 SOLUTION/ DROPS OPHTHALMIC 2 TIMES DAILY
Status: DISCONTINUED | OUTPATIENT
Start: 2024-09-17 | End: 2024-09-18 | Stop reason: HOSPADM

## 2024-09-17 RX ORDER — SODIUM CHLORIDE, SODIUM LACTATE, POTASSIUM CHLORIDE, CALCIUM CHLORIDE 600; 310; 30; 20 MG/100ML; MG/100ML; MG/100ML; MG/100ML
INJECTION, SOLUTION INTRAVENOUS CONTINUOUS PRN
Status: DISCONTINUED | OUTPATIENT
Start: 2024-09-17 | End: 2024-09-17 | Stop reason: SURG

## 2024-09-17 RX ORDER — PREGABALIN 100 MG/1
100 CAPSULE ORAL 3 TIMES DAILY
Status: DISCONTINUED | OUTPATIENT
Start: 2024-09-17 | End: 2024-09-18 | Stop reason: HOSPADM

## 2024-09-17 RX ORDER — NALOXONE HCL 0.4 MG/ML
0.4 VIAL (ML) INJECTION
Status: DISCONTINUED | OUTPATIENT
Start: 2024-09-17 | End: 2024-09-18 | Stop reason: HOSPADM

## 2024-09-17 RX ORDER — LIDOCAINE HYDROCHLORIDE 10 MG/ML
0.5 INJECTION, SOLUTION INFILTRATION; PERINEURAL ONCE AS NEEDED
Status: DISCONTINUED | OUTPATIENT
Start: 2024-09-17 | End: 2024-09-17 | Stop reason: HOSPADM

## 2024-09-17 RX ORDER — BISACODYL 10 MG
10 SUPPOSITORY, RECTAL RECTAL DAILY PRN
Status: DISCONTINUED | OUTPATIENT
Start: 2024-09-17 | End: 2024-09-18 | Stop reason: HOSPADM

## 2024-09-17 RX ORDER — BISACODYL 5 MG/1
5 TABLET, DELAYED RELEASE ORAL DAILY PRN
Status: DISCONTINUED | OUTPATIENT
Start: 2024-09-17 | End: 2024-09-18 | Stop reason: HOSPADM

## 2024-09-17 RX ORDER — PHENYLEPHRINE HCL IN 0.9% NACL 1 MG/10 ML
SYRINGE (ML) INTRAVENOUS AS NEEDED
Status: DISCONTINUED | OUTPATIENT
Start: 2024-09-17 | End: 2024-09-17 | Stop reason: SURG

## 2024-09-17 RX ORDER — ACETAMINOPHEN 160 MG/5ML
650 SOLUTION ORAL EVERY 4 HOURS PRN
Status: DISCONTINUED | OUTPATIENT
Start: 2024-09-17 | End: 2024-09-18 | Stop reason: HOSPADM

## 2024-09-17 RX ORDER — HYDROCODONE BITARTRATE AND ACETAMINOPHEN 5; 325 MG/1; MG/1
1 TABLET ORAL ONCE AS NEEDED
Status: DISCONTINUED | OUTPATIENT
Start: 2024-09-17 | End: 2024-09-17 | Stop reason: HOSPADM

## 2024-09-17 RX ORDER — LIDOCAINE HYDROCHLORIDE 20 MG/ML
INJECTION, SOLUTION INFILTRATION; PERINEURAL AS NEEDED
Status: DISCONTINUED | OUTPATIENT
Start: 2024-09-17 | End: 2024-09-17 | Stop reason: SURG

## 2024-09-17 RX ORDER — ONDANSETRON 2 MG/ML
4 INJECTION INTRAMUSCULAR; INTRAVENOUS ONCE AS NEEDED
Status: DISCONTINUED | OUTPATIENT
Start: 2024-09-17 | End: 2024-09-17 | Stop reason: HOSPADM

## 2024-09-17 RX ORDER — EPHEDRINE SULFATE 50 MG/ML
5 INJECTION, SOLUTION INTRAVENOUS ONCE AS NEEDED
Status: DISCONTINUED | OUTPATIENT
Start: 2024-09-17 | End: 2024-09-17 | Stop reason: HOSPADM

## 2024-09-17 RX ORDER — ENOXAPARIN SODIUM 100 MG/ML
40 INJECTION SUBCUTANEOUS NIGHTLY
Status: DISCONTINUED | OUTPATIENT
Start: 2024-09-18 | End: 2024-09-18 | Stop reason: HOSPADM

## 2024-09-17 RX ORDER — HYDROMORPHONE HYDROCHLORIDE 1 MG/ML
0.25 INJECTION, SOLUTION INTRAMUSCULAR; INTRAVENOUS; SUBCUTANEOUS EVERY 4 HOURS PRN
Status: DISCONTINUED | OUTPATIENT
Start: 2024-09-17 | End: 2024-09-18 | Stop reason: HOSPADM

## 2024-09-17 RX ORDER — FENTANYL CITRATE 50 UG/ML
INJECTION, SOLUTION INTRAMUSCULAR; INTRAVENOUS AS NEEDED
Status: DISCONTINUED | OUTPATIENT
Start: 2024-09-17 | End: 2024-09-17 | Stop reason: SURG

## 2024-09-17 RX ORDER — DEXAMETHASONE SODIUM PHOSPHATE 4 MG/ML
INJECTION, SOLUTION INTRA-ARTICULAR; INTRALESIONAL; INTRAMUSCULAR; INTRAVENOUS; SOFT TISSUE AS NEEDED
Status: DISCONTINUED | OUTPATIENT
Start: 2024-09-17 | End: 2024-09-17 | Stop reason: SURG

## 2024-09-17 RX ORDER — ONDANSETRON 2 MG/ML
INJECTION INTRAMUSCULAR; INTRAVENOUS AS NEEDED
Status: DISCONTINUED | OUTPATIENT
Start: 2024-09-17 | End: 2024-09-17 | Stop reason: SURG

## 2024-09-17 RX ORDER — MIDAZOLAM HYDROCHLORIDE 1 MG/ML
0.5 INJECTION INTRAMUSCULAR; INTRAVENOUS
Status: COMPLETED | OUTPATIENT
Start: 2024-09-17 | End: 2024-09-17

## 2024-09-17 RX ORDER — PREDNISOLONE ACETATE 10 MG/ML
1 SUSPENSION/ DROPS OPHTHALMIC 2 TIMES DAILY
Status: DISCONTINUED | OUTPATIENT
Start: 2024-09-17 | End: 2024-09-18 | Stop reason: HOSPADM

## 2024-09-17 RX ORDER — HYDROCODONE BITARTRATE AND ACETAMINOPHEN 7.5; 325 MG/1; MG/1
1 TABLET ORAL EVERY 4 HOURS PRN
Status: DISCONTINUED | OUTPATIENT
Start: 2024-09-17 | End: 2024-09-17 | Stop reason: HOSPADM

## 2024-09-17 RX ORDER — SODIUM CHLORIDE 0.9 % (FLUSH) 0.9 %
10 SYRINGE (ML) INJECTION EVERY 12 HOURS SCHEDULED
Status: DISCONTINUED | OUTPATIENT
Start: 2024-09-17 | End: 2024-09-18 | Stop reason: HOSPADM

## 2024-09-17 RX ORDER — SODIUM CHLORIDE 0.9 % (FLUSH) 0.9 %
10 SYRINGE (ML) INJECTION AS NEEDED
Status: DISCONTINUED | OUTPATIENT
Start: 2024-09-17 | End: 2024-09-18 | Stop reason: HOSPADM

## 2024-09-17 RX ORDER — ACETAMINOPHEN 325 MG/1
650 TABLET ORAL EVERY 4 HOURS PRN
Status: DISCONTINUED | OUTPATIENT
Start: 2024-09-17 | End: 2024-09-18 | Stop reason: HOSPADM

## 2024-09-17 RX ORDER — DROPERIDOL 2.5 MG/ML
0.62 INJECTION, SOLUTION INTRAMUSCULAR; INTRAVENOUS
Status: DISCONTINUED | OUTPATIENT
Start: 2024-09-17 | End: 2024-09-17 | Stop reason: HOSPADM

## 2024-09-17 RX ORDER — FAMOTIDINE 10 MG/ML
20 INJECTION, SOLUTION INTRAVENOUS ONCE
Status: COMPLETED | OUTPATIENT
Start: 2024-09-17 | End: 2024-09-17

## 2024-09-17 RX ORDER — FLUMAZENIL 0.1 MG/ML
0.2 INJECTION INTRAVENOUS AS NEEDED
Status: DISCONTINUED | OUTPATIENT
Start: 2024-09-17 | End: 2024-09-17 | Stop reason: HOSPADM

## 2024-09-17 RX ORDER — DULOXETIN HYDROCHLORIDE 60 MG/1
60 CAPSULE, DELAYED RELEASE ORAL DAILY
Status: DISCONTINUED | OUTPATIENT
Start: 2024-09-18 | End: 2024-09-18 | Stop reason: HOSPADM

## 2024-09-17 RX ORDER — FENTANYL CITRATE 50 UG/ML
50 INJECTION, SOLUTION INTRAMUSCULAR; INTRAVENOUS ONCE AS NEEDED
Status: DISCONTINUED | OUTPATIENT
Start: 2024-09-17 | End: 2024-09-17 | Stop reason: HOSPADM

## 2024-09-17 RX ORDER — ONDANSETRON 4 MG/1
4 TABLET, ORALLY DISINTEGRATING ORAL EVERY 6 HOURS PRN
Status: DISCONTINUED | OUTPATIENT
Start: 2024-09-17 | End: 2024-09-18 | Stop reason: HOSPADM

## 2024-09-17 RX ORDER — PROMETHAZINE HYDROCHLORIDE 25 MG/1
25 SUPPOSITORY RECTAL ONCE AS NEEDED
Status: DISCONTINUED | OUTPATIENT
Start: 2024-09-17 | End: 2024-09-17 | Stop reason: HOSPADM

## 2024-09-17 RX ORDER — SODIUM CHLORIDE 0.9 % (FLUSH) 0.9 %
3 SYRINGE (ML) INJECTION EVERY 12 HOURS SCHEDULED
Status: DISCONTINUED | OUTPATIENT
Start: 2024-09-17 | End: 2024-09-17 | Stop reason: HOSPADM

## 2024-09-17 RX ADMIN — ROCURONIUM BROMIDE 10 MG: 10 INJECTION, SOLUTION INTRAVENOUS at 14:18

## 2024-09-17 RX ADMIN — FENTANYL CITRATE 50 MCG: 50 INJECTION, SOLUTION INTRAMUSCULAR; INTRAVENOUS at 13:06

## 2024-09-17 RX ADMIN — Medication 10 ML: at 20:24

## 2024-09-17 RX ADMIN — Medication 200 MCG: at 13:49

## 2024-09-17 RX ADMIN — MIDAZOLAM 0.5 MG: 1 INJECTION INTRAMUSCULAR; INTRAVENOUS at 09:35

## 2024-09-17 RX ADMIN — ROCURONIUM BROMIDE 10 MG: 10 INJECTION, SOLUTION INTRAVENOUS at 13:45

## 2024-09-17 RX ADMIN — HYDROMORPHONE HYDROCHLORIDE 0.25 MG: 1 INJECTION, SOLUTION INTRAMUSCULAR; INTRAVENOUS; SUBCUTANEOUS at 16:25

## 2024-09-17 RX ADMIN — SODIUM CHLORIDE, POTASSIUM CHLORIDE, SODIUM LACTATE AND CALCIUM CHLORIDE: 600; 310; 30; 20 INJECTION, SOLUTION INTRAVENOUS at 12:32

## 2024-09-17 RX ADMIN — FENTANYL CITRATE 25 MCG: 50 INJECTION, SOLUTION INTRAMUSCULAR; INTRAVENOUS at 16:58

## 2024-09-17 RX ADMIN — MIDAZOLAM 0.5 MG: 1 INJECTION INTRAMUSCULAR; INTRAVENOUS at 09:41

## 2024-09-17 RX ADMIN — EPHEDRINE SULFATE 10 MG: 50 INJECTION INTRAVENOUS at 13:22

## 2024-09-17 RX ADMIN — SODIUM CHLORIDE, POTASSIUM CHLORIDE, SODIUM LACTATE AND CALCIUM CHLORIDE 9 ML/HR: 600; 310; 30; 20 INJECTION, SOLUTION INTRAVENOUS at 09:26

## 2024-09-17 RX ADMIN — PROPOFOL 180 MG: 10 INJECTION, EMULSION INTRAVENOUS at 12:23

## 2024-09-17 RX ADMIN — ROPINIROLE HYDROCHLORIDE 0.5 MG: 0.5 TABLET, FILM COATED ORAL at 20:23

## 2024-09-17 RX ADMIN — TRAZODONE HYDROCHLORIDE 50 MG: 50 TABLET ORAL at 20:23

## 2024-09-17 RX ADMIN — TIMOLOL MALEATE 1 DROP: 5 SOLUTION/ DROPS OPHTHALMIC at 22:46

## 2024-09-17 RX ADMIN — ROCURONIUM BROMIDE 50 MG: 10 INJECTION, SOLUTION INTRAVENOUS at 12:25

## 2024-09-17 RX ADMIN — Medication 200 MCG: at 13:26

## 2024-09-17 RX ADMIN — HYDROMORPHONE HYDROCHLORIDE 0.25 MG: 1 INJECTION, SOLUTION INTRAMUSCULAR; INTRAVENOUS; SUBCUTANEOUS at 16:58

## 2024-09-17 RX ADMIN — PREGABALIN 100 MG: 100 CAPSULE ORAL at 20:23

## 2024-09-17 RX ADMIN — Medication 200 MCG: at 14:40

## 2024-09-17 RX ADMIN — FENTANYL CITRATE 25 MCG: 50 INJECTION, SOLUTION INTRAMUSCULAR; INTRAVENOUS at 16:50

## 2024-09-17 RX ADMIN — TAMSULOSIN HYDROCHLORIDE 0.4 MG: 0.4 CAPSULE ORAL at 20:23

## 2024-09-17 RX ADMIN — HYDROMORPHONE HYDROCHLORIDE 0.25 MG: 1 INJECTION, SOLUTION INTRAMUSCULAR; INTRAVENOUS; SUBCUTANEOUS at 16:35

## 2024-09-17 RX ADMIN — SODIUM CHLORIDE 2000 MG: 900 INJECTION INTRAVENOUS at 15:50

## 2024-09-17 RX ADMIN — LIDOCAINE HYDROCHLORIDE 100 MG: 20 INJECTION, SOLUTION INFILTRATION; PERINEURAL at 12:23

## 2024-09-17 RX ADMIN — Medication 100 MCG: at 12:44

## 2024-09-17 RX ADMIN — HYDROCODONE BITARTRATE AND ACETAMINOPHEN 1 TABLET: 5; 325 TABLET ORAL at 20:23

## 2024-09-17 RX ADMIN — ONDANSETRON 4 MG: 2 INJECTION INTRAMUSCULAR; INTRAVENOUS at 15:36

## 2024-09-17 RX ADMIN — ROCURONIUM BROMIDE 30 MG: 10 INJECTION, SOLUTION INTRAVENOUS at 13:15

## 2024-09-17 RX ADMIN — HYDROMORPHONE HYDROCHLORIDE 0.25 MG: 1 INJECTION, SOLUTION INTRAMUSCULAR; INTRAVENOUS; SUBCUTANEOUS at 16:50

## 2024-09-17 RX ADMIN — Medication 3 ML: at 10:47

## 2024-09-17 RX ADMIN — SUGAMMADEX 200 MG: 100 INJECTION, SOLUTION INTRAVENOUS at 15:53

## 2024-09-17 RX ADMIN — TRANEXAMIC ACID 1000 MG: 100 INJECTION, SOLUTION INTRAVENOUS at 14:52

## 2024-09-17 RX ADMIN — PREDNISOLONE ACETATE 1 DROP: 10 SUSPENSION/ DROPS OPHTHALMIC at 22:46

## 2024-09-17 RX ADMIN — Medication 200 MCG: at 12:47

## 2024-09-17 RX ADMIN — FAMOTIDINE 20 MG: 10 INJECTION INTRAVENOUS at 09:35

## 2024-09-17 RX ADMIN — DEXAMETHASONE SODIUM PHOSPHATE 10 MG: 4 INJECTION, SOLUTION INTRAMUSCULAR; INTRAVENOUS at 12:28

## 2024-09-17 RX ADMIN — SODIUM CHLORIDE, POTASSIUM CHLORIDE, SODIUM LACTATE AND CALCIUM CHLORIDE: 600; 310; 30; 20 INJECTION, SOLUTION INTRAVENOUS at 15:36

## 2024-09-17 RX ADMIN — ATORVASTATIN CALCIUM 20 MG: 20 TABLET, FILM COATED ORAL at 20:23

## 2024-09-17 RX ADMIN — SODIUM CHLORIDE 2000 MG: 900 INJECTION INTRAVENOUS at 11:50

## 2024-09-17 NOTE — ANESTHESIA PREPROCEDURE EVALUATION
Anesthesia Evaluation     Patient summary reviewed and Nursing notes reviewed   history of anesthetic complications:  prolonged sedation  NPO Solid Status: > 8 hours  NPO Liquid Status: > 2 hours           Airway   Mallampati: II  TM distance: >3 FB  Neck ROM: full  No difficulty expected  Comment: Good ROM neck  Dental - normal exam     Pulmonary - normal exam    breath sounds clear to auscultation  (+) a smoker Former,  Cardiovascular - normal exam    ECG reviewed  Rhythm: regular  Rate: normal    (+) valvular problems/murmurs murmur, hyperlipidemia    ROS comment: ECHO results from [facility] in 6/24 not accessable  PE comment: No murmurs heard    Neuro/Psych  (+) seizures well controlled, numbness, psychiatric history Anxiety    ROS Comment: RLS/[place] left ear/hx seizures/right arm numbness and tingling  GI/Hepatic/Renal/Endo      Musculoskeletal     (+) radiculopathy Right upper extremity      ROS comment: Cervical and thoracic DDD/cervical spinal stenosis with radiculopathy  Abdominal  - normal exam   Substance History      OB/GYN          Other   arthritis,                   Anesthesia Plan    ASA 3     general     (Do not feel intubation will be difficult, but may want CMAC available if needed)  intravenous induction     Anesthetic plan, risks, benefits, and alternatives have been provided, discussed and informed consent has been obtained with: patient.    CODE STATUS:

## 2024-09-17 NOTE — ANESTHESIA PROCEDURE NOTES
Airway  Urgency: elective    Date/Time: 9/17/2024 12:26 PM  Airway not difficult    General Information and Staff    Patient location during procedure: OR  CRNA/CAA: Jameson Carter, BRENDA    Indications and Patient Condition  Indications for airway management: airway protection    Preoxygenated: yes  MILS maintained throughout  Mask difficulty assessment: 2 - vent by mask + OA or adjuvant +/- NMBA    Final Airway Details  Final airway type: endotracheal airway      Successful airway: ETT  Cuffed: yes   Successful intubation technique: direct laryngoscopy  Facilitating devices/methods: intubating stylet  Endotracheal tube insertion site: oral  Blade: CMAC  Blade size: D  ETT size (mm): 7.5  Cormack-Lehane Classification: grade I - full view of glottis  Placement verified by: chest auscultation and capnometry   Measured from: gums  ETT/EBT to gums (cm): 24  Number of attempts at approach: 1  Assessment: lips, teeth, and gum same as pre-op and atraumatic intubation

## 2024-09-18 ENCOUNTER — APPOINTMENT (OUTPATIENT)
Dept: GENERAL RADIOLOGY | Facility: HOSPITAL | Age: 74
End: 2024-09-18
Payer: MEDICARE

## 2024-09-18 VITALS
RESPIRATION RATE: 16 BRPM | BODY MASS INDEX: 26.25 KG/M2 | OXYGEN SATURATION: 95 % | WEIGHT: 163.36 LBS | TEMPERATURE: 98.8 F | DIASTOLIC BLOOD PRESSURE: 78 MMHG | HEART RATE: 75 BPM | SYSTOLIC BLOOD PRESSURE: 131 MMHG | HEIGHT: 66 IN

## 2024-09-18 PROCEDURE — 97161 PT EVAL LOW COMPLEX 20 MIN: CPT

## 2024-09-18 PROCEDURE — 25010000002 HYDROMORPHONE PER 4 MG: Performed by: NEUROLOGICAL SURGERY

## 2024-09-18 PROCEDURE — 72040 X-RAY EXAM NECK SPINE 2-3 VW: CPT

## 2024-09-18 PROCEDURE — 25010000002 CEFAZOLIN PER 500 MG: Performed by: NEUROLOGICAL SURGERY

## 2024-09-18 RX ORDER — METHOCARBAMOL 500 MG/1
500 TABLET, FILM COATED ORAL EVERY 6 HOURS PRN
Qty: 30 TABLET | Refills: 1 | Status: SHIPPED | OUTPATIENT
Start: 2024-09-18

## 2024-09-18 RX ORDER — AMOXICILLIN 250 MG
2 CAPSULE ORAL 2 TIMES DAILY
Qty: 20 TABLET | Refills: 0 | Status: SHIPPED | OUTPATIENT
Start: 2024-09-18

## 2024-09-18 RX ORDER — OXYCODONE AND ACETAMINOPHEN 5; 325 MG/1; MG/1
1 TABLET ORAL EVERY 6 HOURS PRN
Qty: 12 TABLET | Refills: 0 | Status: SHIPPED | OUTPATIENT
Start: 2024-09-18

## 2024-09-18 RX ADMIN — PREGABALIN 100 MG: 100 CAPSULE ORAL at 08:57

## 2024-09-18 RX ADMIN — SENNOSIDES AND DOCUSATE SODIUM 2 TABLET: 50; 8.6 TABLET ORAL at 08:57

## 2024-09-18 RX ADMIN — HYDROMORPHONE HYDROCHLORIDE 0.25 MG: 1 INJECTION, SOLUTION INTRAMUSCULAR; INTRAVENOUS; SUBCUTANEOUS at 04:15

## 2024-09-18 RX ADMIN — DULOXETINE HYDROCHLORIDE 60 MG: 60 CAPSULE, DELAYED RELEASE ORAL at 08:57

## 2024-09-18 RX ADMIN — ROPINIROLE HYDROCHLORIDE 0.5 MG: 0.5 TABLET, FILM COATED ORAL at 08:57

## 2024-09-18 RX ADMIN — Medication 10 ML: at 08:00

## 2024-09-18 RX ADMIN — PREDNISOLONE ACETATE 1 DROP: 10 SUSPENSION/ DROPS OPHTHALMIC at 08:58

## 2024-09-18 RX ADMIN — TIMOLOL MALEATE 1 DROP: 5 SOLUTION/ DROPS OPHTHALMIC at 08:58

## 2024-09-18 RX ADMIN — TAMSULOSIN HYDROCHLORIDE 0.4 MG: 0.4 CAPSULE ORAL at 08:57

## 2024-09-18 RX ADMIN — ATORVASTATIN CALCIUM 20 MG: 20 TABLET, FILM COATED ORAL at 08:57

## 2024-09-18 RX ADMIN — HYDROCODONE BITARTRATE AND ACETAMINOPHEN 1 TABLET: 5; 325 TABLET ORAL at 11:00

## 2024-09-18 RX ADMIN — SODIUM CHLORIDE 2000 MG: 900 INJECTION INTRAVENOUS at 00:42

## 2024-09-18 RX ADMIN — SODIUM CHLORIDE 2000 MG: 900 INJECTION INTRAVENOUS at 08:58

## 2024-09-30 NOTE — PROGRESS NOTES
Patient ID: Tony L Gowers is a 73 y.o. male is an established patient with cervical radiculopathy who has previously undergone cervical four to five, five to six, six to seven discectomy anterior with fusion on 09/17/2024.    Subjective:     History of Present Illness  He is doing well overall.  He does not have any cervical radiculopathy or pain.  He has a little bit of pain between his shoulder blades which is new from surgery.  He is no longer taking any of these postoperative narcotics.  He still taking his Cymbalta and Lyrica which seem to be helping with his other chronic pain issues.  Currently he has not had any major pain issues.  He is able to lift his arms up above his head without triggering and his radiculopathy which is the major factor in proceeding with surgery.      Review of Systems   Constitutional:  Negative for fever.   Musculoskeletal:  Negative for neck pain and neck stiffness.   Neurological:  Positive for numbness. Negative for dizziness, weakness, light-headedness and headaches.        While in the room and during my examination of the patient I wore a mask and eye protection.  I washed my hands before and after this patient encounter.  The patient was also wearing a mask.    The following portions of the patient's history were reviewed and updated as appropriate: allergies, current medications, past family history, past medical history, past social history, past surgical history and problem list.     Objective:    Vitals:    10/02/24 1016   BP: 128/72   Pulse: 67   Resp: 16   Temp: 97.1 °F (36.2 °C)   SpO2: 95%     Body mass index is 25.97 kg/m².    Physical Exam  Constitutional:       Appearance: Normal appearance.   HENT:      Head: Normocephalic and atraumatic.   Eyes:      Extraocular Movements: Extraocular movements intact.      Conjunctiva/sclera: Conjunctivae normal.      Pupils: Pupils are equal, round, and reactive to light.   Cardiovascular:      Rate and Rhythm: Normal rate and  regular rhythm.      Pulses: Normal pulses.   Pulmonary:      Breath sounds: Normal breath sounds.   Abdominal:      Palpations: Abdomen is soft.   Musculoskeletal:         General: Normal range of motion.      Cervical back: Normal range of motion and neck supple.   Skin:     General: Skin is warm and dry.   Neurological:      Mental Status: He is alert and oriented to person, place, and time.      Cranial Nerves: Cranial nerves 2-12 are intact.      Motor: Motor function is intact. No weakness or atrophy.      Coordination: Coordination is intact. Romberg sign negative. Romberg Test normal.      Gait: Gait is intact. Gait normal.      Deep Tendon Reflexes: Reflexes are normal and symmetric.      Reflex Scores:       Tricep reflexes are 2+ on the right side and 2+ on the left side.       Bicep reflexes are 2+ on the right side and 2+ on the left side.       Brachioradialis reflexes are 2+ on the right side and 2+ on the left side.       Patellar reflexes are 2+ on the right side and 2+ on the left side.       Achilles reflexes are 2+ on the right side and 2+ on the left side.  Psychiatric:         Speech: Speech normal.       Neurologic Exam     Mental Status   Oriented to person, place, and time.   Attention: normal. Concentration: normal.   Speech: speech is normal   Level of consciousness: alert    Cranial Nerves   Cranial nerves II through XII intact.     CN III, IV, VI   Pupils are equal, round, and reactive to light.    Motor Exam   Muscle bulk: normal  Overall muscle tone: normal    Strength   Strength 5/5 except as noted.     Sensory Exam   Right arm light touch: decreased from elbow    Gait, Coordination, and Reflexes     Gait  Gait: normal    Coordination   Romberg: negative    Reflexes   Reflexes 2+ except as noted.   Right brachioradialis: 2+  Left brachioradialis: 2+  Right biceps: 2+  Left biceps: 2+  Right triceps: 2+  Left triceps: 2+  Right patellar: 2+  Left patellar: 2+  Right achilles: 2+  Left  achilles: 2+       Results: Imaging    Assessment/Plan: Cleared to lift up to 30 pounds.  I would like to see him back in 4 weeks with another x-ray.  He still has a little bit of numbness in his right forearm but that seems to be improving.       Diagnoses and all orders for this visit:    1. Cervical radiculopathy (Primary)  -     XR spine cervical 2 or 3 vw; Future                Jefe Alexandra MD  10/02/24  10:32 EDT          Answers submitted by the patient for this visit:  Other (Submitted on 9/25/2024)  Please describe your symptoms.: Surgery followup  Have you had these symptoms before?: No  How long have you been having these symptoms?: Greater than 2 weeks  Please list any medications you are currently taking for this condition.: Pain meds muscld relaxers  Please describe any probable cause for these symptoms. : Disc  Primary Reason for Visit (Submitted on 9/25/2024)  What is the primary reason for your visit?: Problem Not Listed

## 2024-10-02 ENCOUNTER — OFFICE VISIT (OUTPATIENT)
Dept: NEUROSURGERY | Facility: CLINIC | Age: 74
End: 2024-10-02
Payer: MEDICARE

## 2024-10-02 VITALS
WEIGHT: 163.36 LBS | BODY MASS INDEX: 25.64 KG/M2 | HEART RATE: 67 BPM | TEMPERATURE: 97.1 F | HEIGHT: 67 IN | RESPIRATION RATE: 16 BRPM | SYSTOLIC BLOOD PRESSURE: 128 MMHG | DIASTOLIC BLOOD PRESSURE: 72 MMHG | OXYGEN SATURATION: 95 %

## 2024-10-02 DIAGNOSIS — M54.12 CERVICAL RADICULOPATHY: Primary | ICD-10-CM

## 2024-10-12 NOTE — ANESTHESIA POSTPROCEDURE EVALUATION
"Patient: Tony L Gowers    Procedure Summary       Date: 09/17/24 Room / Location: [facility] OR [address] [place] [address] MAIN OR    Anesthesia Start: 1213 Anesthesia Stop: 1609    Procedure: CERVICAL FOUR TO FIVE, FIVE TO SIX, SIX TO SEVEN DISCECTOMY ANTERIOR WITH FUSION (Left: Spine Cervical) Diagnosis:       Cervical radiculopathy      (Cervical radiculopathy [M54.12])    Surgeons: Jefe Alexandra MD Provider: Robinson Hicks MD    Anesthesia Type: general ASA Status: 3            Anesthesia Type: general    Vitals  Vitals Value Taken Time   /98 09/17/24 1715   Temp 36.7 °C (98 °F) 09/17/24 1700   Pulse 74 09/17/24 1715   Resp 16 09/17/24 1715   SpO2 99 % 09/17/24 1715           Post Anesthesia Care and Evaluation    Level of consciousness: awake and alert  Pain management: adequate    Airway patency: patent  Anesthetic complications: No anesthetic complications  PONV Status: none  Cardiovascular status: acceptable  Respiratory status: acceptable  Hydration status: acceptable    Comments: /78 (BP Location: Right arm, Patient Position: Lying)   Pulse 75   Temp 37.1 °C (98.8 °F) (Oral)   Resp 16   Ht 168.9 cm (66.5\")   Wt 74.1 kg (163 lb 5.8 oz)   SpO2 95%   BMI 25.98 kg/m²           "

## 2024-10-29 NOTE — PROGRESS NOTES
"Patient ID: Tony L Gowers is a 73 y.o. male is here today for follow-up for cervical radiculopathy.    Imaging: XR of the cervical spine completed on 10/30/2024    Subjective     The patient is here in regards to   Chief Complaint   Patient presents with    Neck Pain    Follow-up       History of Present Illness  Denies any residual cervical radiculopathy.  He has a little bit of trapezius soreness.  He has some difficulty with coughing and clearing his throat as well as swallowing feels unsatisfying but he is able to eat without issue.  He has been keeping busy but not performing any heavy lifting or excessive twisting or bending or stretching.  He is not currently taking any narcotic pain medications.      While in the room and during my examination of the patient I wore a mask and eye protection.  I washed my hands before and after this patient encounter.  The patient was also wearing a mask.    The following portions of the patient's history were reviewed and updated as appropriate: allergies, current medications, past family history, past medical history, past social history, past surgical history and problem list.    Review of Systems   Eyes:  Negative for visual disturbance.   Musculoskeletal:  Positive for neck pain. Negative for neck stiffness.   Neurological:  Positive for numbness. Negative for dizziness, weakness, light-headedness and headaches.        Past Medical History:   Diagnosis Date    Anesthesia     SLOW TO WAKE UP IN THE PAST    Anxiety     Arthritis     DDD (degenerative disc disease), cervical     DDD (degenerative disc disease), thoracic     Heart murmur     Jena (hard of hearing)     ALMOST DEAF IN LT EAR / NO HEARING AIDS    Hyperlipidemia     [place] march seizure     \"probably last year\" mild seizure. patient states a full blown seizure has been years ago.    Left eye injury     \"S\"  HOCK  HIT HIS EYE    Numbness and tingling     IN RIGHT FOREARM    Pulmonary hypertension     MILD    " RLS (restless legs syndrome)     Slow urinary stream        No Known Allergies    Family History   Problem Relation Age of Onset    Malig Hyperthermia Neg Hx        Social History     Socioeconomic History    Marital status:    Tobacco Use    Smoking status: Former     Current packs/day: 0.00     Types: Cigarettes     Quit date: 1994     Years since quittin.2    Smokeless tobacco: Never    Tobacco comments:     Stopped smoking at 44 years old   Vaping Use    Vaping status: Never Used   Substance and Sexual Activity    Alcohol use: Yes     Comment: OCCAS    Drug use: Never    Sexual activity: Defer       Past Surgical History:   Procedure Laterality Date    CATARACT EXTRACTION Right     COLONOSCOPY      SEVERAL    CORNEAL TRANSPLANT Left     EYE SURGERY Left     SEVERAL TIMES FROM INJURY    INGUINAL HERNIA REPAIR Right 2014    KNEE ARTHROPLASTY, PARTIAL REPLACEMENT Left 2006    TOTAL KNEE ARTHROPLASTY Right     TOTAL KNEE ARTHROPLASTY REVISION Right          Objective     Vitals:    10/30/24 1117   BP: 124/68   Pulse: 71   Resp: 16   Temp: 97.1 °F (36.2 °C)   SpO2: 94%     Body mass index is 25.97 kg/m².    Physical Exam  Constitutional:       General: He is awake.      Appearance: Normal appearance.   HENT:      Head: Normocephalic and atraumatic.   Eyes:      General: Lids are normal.      Extraocular Movements: Extraocular movements intact.      Conjunctiva/sclera: Conjunctivae normal.      Pupils: Pupils are equal, round, and reactive to light.   Cardiovascular:      Rate and Rhythm: Normal rate and regular rhythm.      Pulses: Normal pulses.   Pulmonary:      Breath sounds: Normal breath sounds.   Abdominal:      Palpations: Abdomen is soft.   Musculoskeletal:         General: Normal range of motion.      Cervical back: Normal range of motion and neck supple.   Skin:     General: Skin is warm and dry.   Neurological:      Mental Status: He is alert and oriented to person, place, and  time.      Motor: Motor function is intact. No weakness or atrophy.      Coordination: Coordination is intact. Romberg sign negative.      Gait: Gait is intact. Gait normal.      Deep Tendon Reflexes: Reflexes are normal and symmetric.      Reflex Scores:       Tricep reflexes are 2+ on the right side and 2+ on the left side.       Bicep reflexes are 2+ on the right side and 2+ on the left side.       Brachioradialis reflexes are 2+ on the right side and 2+ on the left side.       Patellar reflexes are 2+ on the right side and 2+ on the left side.       Achilles reflexes are 2+ on the right side and 2+ on the left side.        Neurological Exam  Mental Status  Awake and alert. Oriented to person, place and time. Oriented to person, place, and time.    Cranial Nerves  CN II: Visual acuity is normal. Visual fields full to confrontation.  CN III, IV, VI: Extraocular movements intact bilaterally. Normal lids and orbits bilaterally. Pupils equal round and reactive to light bilaterally.  CN V: Facial sensation is normal.  CN VII: Full and symmetric facial movement.  CN IX, X: Palate elevates symmetrically. Normal gag reflex.  CN XI: Shoulder shrug strength is normal.  CN XII: Tongue midline without atrophy or fasciculations.    Motor                                               Right                     Left  Deltoid                                   5                          5   Biceps                                   5                          5   Iliopsoas                               5                          5   Quadriceps                           5                          5   Hamstring                             5                          5   Gastrocnemius                     5                           5   Anterior tibialis                      5                          5    Sensory  Sensation is intact to light touch, pinprick, vibration and proprioception in all four extremities.    Reflexes  Deep tendon  reflexes are 2+ and symmetric in all four extremities.                                            Right                      Left  Brachioradialis                    2+                         2+  Biceps                                 2+                         2+  Triceps                                2+                         2+  Patellar                                2+                         2+  Achilles                                2+                         2+    Coordination    Finger-to-nose, rapid alternating movements and heel-to-shin normal bilaterally without dysmetria.    Gait   Normal gait. Normal gait. Romberg is absent.      Assessment & Plan   Independent Review of Radiographic Studies:      I personally reviewed the images from the following studies.    INDICATION: Follow up from fusion    FINDINGS:    XR: XR of the cervical spine was reviewed and shows stable hardware alignment compared to previous x-ray.  There appears to be the beginning of early fusion across his interbody spacers    Assessment/Plan: Clinically doing well.  I do think that his swallowing issues and potentially coughing issues are related to his stretching of the omohyoid muscle during surgery.  I think that that will get better over time spontaneously.  I recommended that he continue with his normal activity but not lift anything heavier than 60 pounds.  See him back in 6 weeks with a follow-up x-ray.    Medical Decision Making:      X-ray cervical spine         Diagnoses and all orders for this visit:    1. Cervical radiculopathy (Primary)  -     XR spine cervical 2 or 3 vw; Future             Patient Instructions/Recommendations:    Follow-up in 6 weeks      Jefe Alexandra MD  10/30/24  12:00 EDT

## 2024-10-30 ENCOUNTER — OFFICE VISIT (OUTPATIENT)
Dept: NEUROSURGERY | Facility: CLINIC | Age: 74
End: 2024-10-30
Payer: MEDICARE

## 2024-10-30 VITALS
TEMPERATURE: 97.1 F | WEIGHT: 163.36 LBS | HEIGHT: 67 IN | HEART RATE: 71 BPM | RESPIRATION RATE: 16 BRPM | SYSTOLIC BLOOD PRESSURE: 124 MMHG | BODY MASS INDEX: 25.64 KG/M2 | DIASTOLIC BLOOD PRESSURE: 68 MMHG | OXYGEN SATURATION: 94 %

## 2024-10-30 DIAGNOSIS — M54.12 CERVICAL RADICULOPATHY: Primary | ICD-10-CM

## 2024-10-30 PROCEDURE — 1160F RVW MEDS BY RX/DR IN RCRD: CPT | Performed by: NEUROLOGICAL SURGERY

## 2024-10-30 PROCEDURE — 1159F MED LIST DOCD IN RCRD: CPT | Performed by: NEUROLOGICAL SURGERY

## 2024-10-30 PROCEDURE — 99024 POSTOP FOLLOW-UP VISIT: CPT | Performed by: NEUROLOGICAL SURGERY

## 2024-10-30 RX ORDER — CELECOXIB 200 MG/1
200 CAPSULE ORAL DAILY
COMMUNITY

## 2024-12-18 NOTE — PROGRESS NOTES
"Patient ID: Tony L Gowers is a 73 y.o. male is here today for follow-up cervical radiculopathy.    Imaging: XR of the cervical spine completed on 12/19/2024    Subjective     The patient is here in regards to   Chief Complaint   Patient presents with    Neck Pain    Follow-up       History of Present Illness  Is overall doing very well.  Denies any neck pain or radiculopathy.  He has a little bit of trapezius pain but that is gradually getting better and seems to be alleviated by massage when his wife is able to give him a massage over his trapezius and paraspinal muscles of the neck.  He no longer has any swallowing issues or any difficulties with his incision.  He is gradually weaning himself off of Lyrica due to concerns for hair loss.  He has not had any issues during this process.      While in the room and during my examination of the patient I wore a mask and eye protection.  I washed my hands before and after this patient encounter.  The patient was also wearing a mask.    The following portions of the patient's history were reviewed and updated as appropriate: allergies, current medications, past family history, past medical history, past social history, past surgical history and problem list.    Review of Systems   Eyes:  Negative for visual disturbance.   Gastrointestinal:  Negative for nausea and vomiting.   Musculoskeletal:  Negative for neck pain and neck stiffness.   Neurological:  Positive for weakness and numbness. Negative for dizziness, light-headedness and headaches.   Psychiatric/Behavioral:  Negative for decreased concentration.         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 patient states a full blown seizure has been years ago.    Left eye injury     \"S\"  HOCK  HIT HIS EYE    Numbness and tingling     IN RIGHT FOREARM    Pulmonary hypertension     MILD    RLS (restless legs syndrome)     Slow urinary stream        No Known Allergies    Family History   Problem Relation Age of Onset    Malig Hyperthermia Neg Hx        Social History     Socioeconomic History    Marital status:    Tobacco Use    Smoking status: Former     Current packs/day: 0.00     Types: Cigarettes     Quit date: 1994     Years since quittin.3    Smokeless tobacco: Never    Tobacco comments:     Stopped smoking at 44 years old   Vaping Use    Vaping status: Never Used   Substance and Sexual Activity    Alcohol use: Yes     Comment: OCCAS    Drug use: Never    Sexual activity: Defer       Past Surgical History:   Procedure Laterality Date    ANTERIOR CERVICAL DISCECTOMY W/ FUSION Left 2024    Procedure: CERVICAL FOUR TO FIVE, FIVE TO SIX, SIX TO SEVEN DISCECTOMY ANTERIOR WITH FUSION;  Surgeon: Jefe Alexandra MD;  Location: [facility];  Service: Neurosurgery;  Laterality: Left;    CATARACT EXTRACTION Right     COLONOSCOPY      SEVERAL    CORNEAL TRANSPLANT Left     EYE SURGERY Left     SEVERAL TIMES FROM INJURY    INGUINAL HERNIA REPAIR Right 2014    KNEE ARTHROPLASTY, PARTIAL REPLACEMENT Left 2006    TOTAL KNEE ARTHROPLASTY Right     TOTAL KNEE ARTHROPLASTY REVISION Right          Objective     Vitals:    24 1150   BP: 122/74   Pulse: 85   Resp: 16   Temp: 97.3 °F (36.3 °C)   SpO2: 99%     Body mass index is 25.97 kg/m².    Physical Exam  Constitutional:       General: He is awake.      Appearance: Normal appearance.   HENT:      Head: Normocephalic and atraumatic.   Eyes:      General: Lids are normal.      Extraocular Movements: Extraocular movements intact.      Conjunctiva/sclera: Conjunctivae normal.      Pupils: Pupils are equal, round, and reactive to light. "   Cardiovascular:      Rate and Rhythm: Normal rate and regular rhythm.      Pulses: Normal pulses.   Pulmonary:      Breath sounds: Normal breath sounds.   Abdominal:      Palpations: Abdomen is soft.   Musculoskeletal:         General: Normal range of motion.      Cervical back: Normal range of motion and neck supple.   Skin:     General: Skin is warm and dry.   Neurological:      Mental Status: He is alert and oriented to person, place, and time.      Motor: Motor function is intact. No weakness or atrophy.      Coordination: Coordination is intact. Romberg sign negative.      Gait: Gait is intact. Gait normal.      Deep Tendon Reflexes: Reflexes are normal and symmetric.      Reflex Scores:       Tricep reflexes are 2+ on the right side and 2+ on the left side.       Bicep reflexes are 2+ on the right side and 2+ on the left side.       Brachioradialis reflexes are 2+ on the right side and 2+ on the left side.       Patellar reflexes are 2+ on the right side and 2+ on the left side.       Achilles reflexes are 2+ on the right side and 2+ on the left side.        Neurological Exam  Mental Status  Awake and alert. Oriented to person, place and time. Oriented to person, place, and time.    Cranial Nerves  CN II: Visual acuity is normal. Visual fields full to confrontation.  CN III, IV, VI: Extraocular movements intact bilaterally. Normal lids and orbits bilaterally. Pupils equal round and reactive to light bilaterally.  CN V: Facial sensation is normal.  CN VII: Full and symmetric facial movement.  CN IX, X: Palate elevates symmetrically. Normal gag reflex.  CN XI: Shoulder shrug strength is normal.  CN XII: Tongue midline without atrophy or fasciculations.    Motor                                               Right                     Left  Deltoid                                   5                          5   Biceps                                   5                          5   Iliopsoas                                5                          5   Quadriceps                           5                          5   Hamstring                             5                          5   Gastrocnemius                     5                           5   Anterior tibialis                      5                          5    Sensory  Sensation is intact to light touch, pinprick, vibration and proprioception in all four extremities.    Reflexes  Deep tendon reflexes are 2+ and symmetric in all four extremities.                                            Right                      Left  Brachioradialis                    2+                         2+  Biceps                                 2+                         2+  Triceps                                2+                         2+  Patellar                                2+                         2+  Achilles                                2+                         2+    Coordination    Finger-to-nose, rapid alternating movements and heel-to-shin normal bilaterally without dysmetria.    Gait   Normal gait. Normal gait. Romberg is absent.      Assessment & Plan   Independent Review of Radiographic Studies:      I personally reviewed the images from the following studies.    INDICATION: Follow up from fusion    FINDINGS:    XR: XR of the cervical spine was reviewed and shows solid fusion across C4-5, C5-6, C6-7 which appears nearly fused compared to the previous x-ray.    Assessment/Plan: X-ray appears to show solid fusion.  I no longer have any activity restrictions for him.  He is overall seems to be doing well and I think that his trapezius pain has already began improving and will continue to improve over time.    Medical Decision Making:      Follow-up as needed         Diagnoses and all orders for this visit:    1. Cervical radiculopathy (Primary)             Patient Instructions/Recommendations:    Call with any questions or concerns      Jefe Alexandra,  MD  12/19/24  12:05 EST

## 2024-12-19 ENCOUNTER — OFFICE VISIT (OUTPATIENT)
Dept: NEUROSURGERY | Facility: CLINIC | Age: 74
End: 2024-12-19
Payer: MEDICARE

## 2024-12-19 ENCOUNTER — HOSPITAL ENCOUNTER (OUTPATIENT)
Dept: GENERAL RADIOLOGY | Facility: HOSPITAL | Age: 74
Discharge: HOME | End: 2024-12-19
Admitting: NEUROLOGICAL SURGERY
Payer: MEDICARE

## 2024-12-19 VITALS
BODY MASS INDEX: 25.64 KG/M2 | SYSTOLIC BLOOD PRESSURE: 122 MMHG | OXYGEN SATURATION: 99 % | TEMPERATURE: 97.3 F | WEIGHT: 163.36 LBS | RESPIRATION RATE: 16 BRPM | HEART RATE: 85 BPM | DIASTOLIC BLOOD PRESSURE: 74 MMHG | HEIGHT: 67 IN

## 2024-12-19 DIAGNOSIS — M54.12 CERVICAL RADICULOPATHY: Primary | ICD-10-CM

## 2024-12-19 DIAGNOSIS — M54.12 CERVICAL RADICULOPATHY: ICD-10-CM

## 2024-12-19 PROCEDURE — 72040 X-RAY EXAM NECK SPINE 2-3 VW: CPT

## 2024-12-19 PROCEDURE — 99024 POSTOP FOLLOW-UP VISIT: CPT | Performed by: NEUROLOGICAL SURGERY
